# Patient Record
Sex: FEMALE | Race: WHITE | NOT HISPANIC OR LATINO | Employment: OTHER | URBAN - METROPOLITAN AREA
[De-identification: names, ages, dates, MRNs, and addresses within clinical notes are randomized per-mention and may not be internally consistent; named-entity substitution may affect disease eponyms.]

---

## 2023-02-12 PROBLEM — R82.998 LEUKOCYTES IN URINE: Status: ACTIVE | Noted: 2023-02-12

## 2023-02-12 PROBLEM — E11.9 DIABETES MELLITUS, TYPE 2 (MULTI): Status: ACTIVE | Noted: 2023-02-12

## 2023-02-12 PROBLEM — K21.9 GASTROESOPHAGEAL REFLUX DISEASE: Status: ACTIVE | Noted: 2023-02-12

## 2023-02-12 PROBLEM — I65.23 ARTERIOSCLEROSIS OF BOTH CAROTID ARTERIES: Status: ACTIVE | Noted: 2023-02-12

## 2023-02-12 PROBLEM — E55.9 VITAMIN D DEFICIENCY: Status: ACTIVE | Noted: 2023-02-12

## 2023-02-12 PROBLEM — H91.90 HARD OF HEARING: Status: ACTIVE | Noted: 2023-02-12

## 2023-02-12 PROBLEM — R25.2 BILATERAL LEG CRAMPS: Status: ACTIVE | Noted: 2023-02-12

## 2023-02-12 PROBLEM — I10 HYPERTENSION: Status: ACTIVE | Noted: 2023-02-12

## 2023-02-12 PROBLEM — G62.9 POLYNEUROPATHY: Status: ACTIVE | Noted: 2023-02-12

## 2023-02-12 PROBLEM — M47.816 LUMBAR SPONDYLOSIS: Status: ACTIVE | Noted: 2023-02-12

## 2023-02-12 PROBLEM — R35.1 NOCTURIA: Status: ACTIVE | Noted: 2023-02-12

## 2023-02-12 PROBLEM — D17.9 BENIGN LIPOMATOUS TUMOR: Status: ACTIVE | Noted: 2023-02-12

## 2023-02-12 PROBLEM — I77.9 CAROTID ARTERY DISEASE (CMS-HCC): Status: ACTIVE | Noted: 2023-02-12

## 2023-02-12 PROBLEM — M19.90 ARTHRITIS: Status: ACTIVE | Noted: 2023-02-12

## 2023-02-12 PROBLEM — F41.9 ANXIETY: Status: ACTIVE | Noted: 2023-02-12

## 2023-02-12 PROBLEM — R58 ECCHYMOSIS: Status: ACTIVE | Noted: 2023-02-12

## 2023-02-12 PROBLEM — E78.00 HYPERCHOLESTEROLEMIA: Status: ACTIVE | Noted: 2023-02-12

## 2023-02-12 PROBLEM — H40.9 GLAUCOMA: Status: ACTIVE | Noted: 2023-02-12

## 2023-02-12 PROBLEM — G47.33 OBSTRUCTIVE SLEEP APNEA: Status: ACTIVE | Noted: 2023-02-12

## 2023-02-12 PROBLEM — R31.21 ASYMPTOMATIC MICROSCOPIC HEMATURIA: Status: ACTIVE | Noted: 2023-02-12

## 2023-02-12 PROBLEM — I65.21 CAROTID STENOSIS, ASYMPTOMATIC, RIGHT: Status: ACTIVE | Noted: 2023-02-12

## 2023-02-12 PROBLEM — L29.9 PRURITUS: Status: ACTIVE | Noted: 2023-02-12

## 2023-02-12 PROBLEM — M54.16 CHRONIC LUMBAR RADICULOPATHY: Status: ACTIVE | Noted: 2023-02-12

## 2023-02-12 PROBLEM — I99.8 VASCULAR INSUFFICIENCY: Status: ACTIVE | Noted: 2023-02-12

## 2023-02-12 PROBLEM — M17.9 KNEE OSTEOARTHRITIS: Status: ACTIVE | Noted: 2023-02-12

## 2023-02-12 PROBLEM — G89.29 CHRONIC LOWER BACK PAIN: Status: ACTIVE | Noted: 2023-02-12

## 2023-02-12 PROBLEM — J32.9 SINUSITIS: Status: ACTIVE | Noted: 2023-02-12

## 2023-02-12 PROBLEM — M54.50 CHRONIC LOWER BACK PAIN: Status: ACTIVE | Noted: 2023-02-12

## 2023-02-12 PROBLEM — R32 URINARY INCONTINENCE IN FEMALE: Status: ACTIVE | Noted: 2023-02-12

## 2023-02-12 PROBLEM — N32.81 OAB (OVERACTIVE BLADDER): Status: ACTIVE | Noted: 2023-02-12

## 2023-02-12 RX ORDER — CYCLOSPORINE 0.5 MG/ML
1 EMULSION OPHTHALMIC 2 TIMES DAILY PRN
COMMUNITY

## 2023-02-12 RX ORDER — MELOXICAM 7.5 MG/1
1 TABLET ORAL DAILY
COMMUNITY
Start: 2022-01-17

## 2023-02-12 RX ORDER — MIRABEGRON 25 MG/1
1 TABLET, FILM COATED, EXTENDED RELEASE ORAL DAILY
COMMUNITY
Start: 2022-07-16

## 2023-02-12 RX ORDER — VIT C/E/ZN/COPPR/LUTEIN/ZEAXAN 250MG-90MG
1 CAPSULE ORAL DAILY
COMMUNITY

## 2023-02-12 RX ORDER — LANSOPRAZOLE 30 MG/1
1 CAPSULE, DELAYED RELEASE ORAL DAILY
COMMUNITY
Start: 2017-05-31

## 2023-02-12 RX ORDER — CHOLECALCIFEROL (VITAMIN D3) 25 MCG
1 TABLET ORAL DAILY
COMMUNITY

## 2023-02-12 RX ORDER — AMLODIPINE BESYLATE 10 MG/1
TABLET ORAL
COMMUNITY
Start: 2022-05-24

## 2023-02-12 RX ORDER — GUAIFENESIN 600 MG/1
TABLET, EXTENDED RELEASE ORAL
COMMUNITY

## 2023-02-12 RX ORDER — METOPROLOL SUCCINATE 50 MG/1
TABLET, EXTENDED RELEASE ORAL
COMMUNITY
Start: 2022-04-07

## 2023-02-12 RX ORDER — METFORMIN HYDROCHLORIDE 500 MG/1
1 TABLET, EXTENDED RELEASE ORAL 2 TIMES DAILY
COMMUNITY
Start: 2018-10-02

## 2023-02-12 RX ORDER — ATORVASTATIN CALCIUM 20 MG/1
TABLET, FILM COATED ORAL
COMMUNITY
Start: 2018-02-23

## 2023-02-12 RX ORDER — TROSPIUM CHLORIDE ER 60 MG/1
1 CAPSULE ORAL DAILY
COMMUNITY
Start: 2022-06-09

## 2023-02-12 RX ORDER — LISINOPRIL 40 MG/1
TABLET ORAL
COMMUNITY
Start: 2021-07-08

## 2023-12-05 ENCOUNTER — APPOINTMENT (OUTPATIENT)
Dept: PRIMARY CARE | Facility: CLINIC | Age: 83
End: 2023-12-05
Payer: MEDICARE

## 2025-05-31 ENCOUNTER — ANCILLARY PROCEDURE (OUTPATIENT)
Dept: URGENT CARE | Age: 85
End: 2025-05-31
Payer: MEDICARE

## 2025-05-31 ENCOUNTER — OFFICE VISIT (OUTPATIENT)
Dept: URGENT CARE | Age: 85
End: 2025-05-31
Payer: MEDICARE

## 2025-05-31 VITALS
SYSTOLIC BLOOD PRESSURE: 127 MMHG | TEMPERATURE: 98.2 F | WEIGHT: 179 LBS | RESPIRATION RATE: 16 BRPM | BODY MASS INDEX: 29.82 KG/M2 | HEIGHT: 65 IN | DIASTOLIC BLOOD PRESSURE: 76 MMHG | HEART RATE: 76 BPM | OXYGEN SATURATION: 96 %
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
## 2025-05-31 PROCEDURE — 72220 X-RAY EXAM SACRUM TAILBONE: CPT | Performed by: HOSPITALIST

## 2025-05-31 ASSESSMENT — PAIN SCALES - GENERAL: PAINLEVEL_OUTOF10: 9

## 2025-05-31 ASSESSMENT — PATIENT HEALTH QUESTIONNAIRE - PHQ9
2. FEELING DOWN, DEPRESSED OR HOPELESS: NOT AT ALL
SUM OF ALL RESPONSES TO PHQ9 QUESTIONS 1 AND 2: 0
1. LITTLE INTEREST OR PLEASURE IN DOING THINGS: NOT AT ALL

## 2025-05-31 ASSESSMENT — ENCOUNTER SYMPTOMS: CONSTITUTIONAL NEGATIVE: 1

## 2025-05-31 NOTE — PROGRESS NOTES
"Subjective   Patient ID: Chyna Apodaca is a 85 y.o. female. They present today with a chief complaint of Injury (Fell in the kitchen earlier today and lander on her tailbone).    History of Present Illness  Pt slipped on kitchen floor over a spilled pot of chili and fell on her tailbone      Injury      Past Medical History  Allergies as of 05/31/2025 - Reviewed 05/31/2025   Allergen Reaction Noted    Glipizide Itching 02/12/2023    Meperidine Nausea Only and Other 02/12/2023       Prescriptions Prior to Admission[1]     Medical History[2]    Surgical History[3]     reports that she has never smoked. She has never used smokeless tobacco.    Review of Systems  Review of Systems   Constitutional: Negative.    Musculoskeletal:         Pain of tailbone                                  Objective    Vitals:    05/31/25 1802   BP: 127/76   BP Location: Left arm   Patient Position: Sitting   Pulse: 76   Resp: 16   Temp: 36.8 °C (98.2 °F)   SpO2: 96%   Weight: 81.2 kg (179 lb)   Height: 1.651 m (5' 5\")     No LMP recorded.    Physical Exam  Constitutional:       Appearance: Normal appearance.   Musculoskeletal:      Comments: Pt walking well with a walker   No weakness some pain over sacrum   Neurological:      Mental Status: She is alert.         Procedures    Point of Care Test & Imaging Results from this visit  No results found for this visit on 05/31/25.   Imaging  No results found.    Cardiology, Vascular, and Other Imaging  No other imaging results found for the past 2 days      Diagnostic study results (if any) were reviewed by Conchita Melendrez MD.    Assessment/Plan   Allergies, medications, history, and pertinent labs/EKGs/Imaging reviewed by Conchita Melendrez MD.     Medical Decision Making  Fall no fracture supportive treatment    Orders and Diagnoses  Diagnoses and all orders for this visit:  Fall, initial encounter  -     XR sacrum coccyx 2+ views; Future      Medical Admin Record      Patient disposition: " Home    Electronically signed by Conchita Melendrez MD  6:27 PM           [1] (Not in a hospital admission)  [2]   Past Medical History:  Diagnosis Date    Other chest pain 12/13/2015    Chest pain, atypical    Personal history of other diseases of the nervous system and sense organs     History of impacted cerumen    Personal history of other infectious and parasitic diseases     History of herpes zoster    Personal history of pulmonary embolism     Personal history of pulmonary embolism    Unspecified fall, initial encounter 01/29/2016    Fall, accidental    Vascular disorder of intestine, unspecified     Ischemic colitis   [3]   Past Surgical History:  Procedure Laterality Date    APPENDECTOMY  09/25/2014    Appendectomy    CATARACT EXTRACTION  09/25/2014    Cataract Surgery    COLONOSCOPY  09/25/2014    Complete Colonoscopy    GALLBLADDER SURGERY  09/25/2014    Gallbladder Surgery    OTHER SURGICAL HISTORY  09/25/2014    Total Abdominal Hysterectomy With Removal Of Ovary(S)    OTHER SURGICAL HISTORY  05/16/2022    Bladder surgery    OTHER SURGICAL HISTORY  05/16/2022    Varicose vein ligation    TOTAL KNEE ARTHROPLASTY  09/25/2014    Knee Replacement

## 2025-07-24 ENCOUNTER — APPOINTMENT (OUTPATIENT)
Facility: CLINIC | Age: 85
End: 2025-07-24
Payer: MEDICARE